# Patient Record
Sex: MALE
[De-identification: names, ages, dates, MRNs, and addresses within clinical notes are randomized per-mention and may not be internally consistent; named-entity substitution may affect disease eponyms.]

---

## 2022-09-07 ENCOUNTER — NURSE TRIAGE (OUTPATIENT)
Dept: OTHER | Facility: CLINIC | Age: 82
End: 2022-09-07

## 2022-09-07 NOTE — TELEPHONE ENCOUNTER
Subjective: Caller states \"I was dizzy\"     Current Symptoms: has noticed a couple of episodes of dizziness lasting a few seconds. Noticed changing position like sitting to standing. Some vertigo with the short episode of dizziness. No headache or nausea. No vision issues. No numbness or tinging. Checked BP and it was ok. Denies chest pain, shortness of breath or heart rate issues. Onset: this morning    Associated Symptoms: NA    Pain Severity: 0/10; N/A; none    Temperature: no fever     What has been tried: nothing    LMP: NA Pregnant: NA    Recommended disposition: See PCP within 3 Days    Care advice provided, patient verbalizes understanding; denies any other questions or concerns; instructed to call back for any new or worsening symptoms. Patient/caller agrees to follow-up with PCP     This triage is a result of a call to [facility] [employer]. Please do not respond to the triage nurse through this encounter. Any subsequent communication should be directly with the patient.     Reason for Disposition   MILD dizziness (e.g., walking normally) and has NOT been evaluated by physician for this (Exception: dizziness caused by heat exposure, sudden standing, or poor fluid intake)    Protocols used: Dizziness-ADULT-[place]

## 2022-09-07 NOTE — TELEPHONE ENCOUNTER
Subjective: Caller states \"dizziness\"     Current Symptoms:   + episodes of dizziness, when changing from walker to wheelchair, 2 dizzy spells after hip surgery   - pain, weakness, fever, speech changes, vision changes    Onset:     Few days     Pain Severity:   None     Temperature:    None     What has been tried:   Drinking fluids     Recommended disposition: See PCP within 24 Hours    Care advice provided, patient verbalizes understanding; denies any other questions or concerns; instructed to call back for any new or worsening symptoms. This triage is a result of a call to [address] [place] [address]. Please do not respond to the triage nurse through this encounter. Any subsequent communication should be directly with the patient.     Reason for Disposition   [1] MODERATE dizziness (e.g., vertigo; feels very unsteady, interferes with normal activities) AND [2] has NOT been evaluated by physician for this    Protocols used: Dizziness - Vertigo-ADULT-

## 2023-07-13 ENCOUNTER — NURSE TRIAGE (OUTPATIENT)
Dept: OTHER | Facility: CLINIC | Age: 83
End: 2023-07-13

## 2023-07-14 NOTE — TELEPHONE ENCOUNTER
Location of patient: [place]    Subjective: Caller states Nose bleed, was bleeding for 15 minutes. Put tissue in nose and this stopped the bleeding. Does not take blood thinners, has not notices easy bruising lately. First nose bleed. Current Symptoms: none    Onset: nosebleed started 45 minutes ago, bleeding stopped 30 minutes ago    Associated Symptoms: NA    Pain Severity: 0/10; Temperature: no fever reported by unknown method    What has been tried: placed tissue in nose    Recommended disposition: [facility] advice provided, patient verbalizes understanding; denies any other questions or concerns; instructed to call back for any new or worsening symptoms. Home Care, will follow instructions for appropriate technique and will call back if bleeding does not stop. This triage is a result of a call to [address] [place] [address]. Please do not respond to the triage nurse through this encounter. Any subsequent communication should be directly with the patient.       Reason for Disposition   [1] Nosebleed AND [2] bleeding present < 30 minutes AND [3] using correct technique per guideline    Protocols used: Nosebleed-ADULT-